# Patient Record
Sex: FEMALE | Race: WHITE | NOT HISPANIC OR LATINO | URBAN - METROPOLITAN AREA
[De-identification: names, ages, dates, MRNs, and addresses within clinical notes are randomized per-mention and may not be internally consistent; named-entity substitution may affect disease eponyms.]

---
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
## 2025-05-30 ENCOUNTER — EMERGENCY (EMERGENCY)
Facility: HOSPITAL | Age: 20
LOS: 1 days | End: 2025-05-30
Attending: EMERGENCY MEDICINE | Admitting: EMERGENCY MEDICINE
Payer: MEDICAID

## 2025-05-30 VITALS
WEIGHT: 136.91 LBS | RESPIRATION RATE: 20 BRPM | HEIGHT: 63 IN | TEMPERATURE: 97 F | SYSTOLIC BLOOD PRESSURE: 111 MMHG | HEART RATE: 112 BPM | DIASTOLIC BLOOD PRESSURE: 73 MMHG | OXYGEN SATURATION: 97 %

## 2025-05-30 PROCEDURE — 99284 EMERGENCY DEPT VISIT MOD MDM: CPT

## 2025-05-30 RX ORDER — KETOROLAC TROMETHAMINE 30 MG/ML
15 INJECTION, SOLUTION INTRAMUSCULAR; INTRAVENOUS ONCE
Refills: 0 | Status: DISCONTINUED | OUTPATIENT
Start: 2025-05-30 | End: 2025-05-30

## 2025-05-30 RX ORDER — LIDOCAINE HYDROCHLORIDE 20 MG/ML
15 JELLY TOPICAL ONCE
Refills: 0 | Status: DISCONTINUED | OUTPATIENT
Start: 2025-05-30 | End: 2025-05-30

## 2025-05-30 RX ORDER — IBUPROFEN 200 MG
1 TABLET ORAL
Qty: 9 | Refills: 0
Start: 2025-05-30 | End: 2025-06-01

## 2025-05-30 RX ORDER — DEXAMETHASONE 0.5 MG/1
10 TABLET ORAL ONCE
Refills: 0 | Status: COMPLETED | OUTPATIENT
Start: 2025-05-30 | End: 2025-05-30

## 2025-05-30 RX ADMIN — DEXAMETHASONE 10 MILLIGRAM(S): 0.5 TABLET ORAL at 05:44

## 2025-05-30 NOTE — ED ADULT TRIAGE NOTE - CHIEF COMPLAINT QUOTE
Pt is c/o sore throat and chills since last night.  c/o difficulty swallowing and talking due to swelling in her throat. Denies fever. She was diagnosed with Grave's disease few days ago after having palpitation.  Pt is on Methimazole and propanolol. Pt appears to be in no apparent distress.

## 2025-05-30 NOTE — ED PROVIDER NOTE - CLINICAL SUMMARY MEDICAL DECISION MAKING FREE TEXT BOX
20 female Graves disease now with sore throat, fever, chills, inability to take down solids/liquids.  (baseline) o/w HD stable. The patient presents with symptoms likely consistent with acute pharyngitis, likely viral in origin. Given her recent diagnosis of Graves' disease, she is concerned about potential thyroid involvement. Shall perform a flu panel. Prescribe pain medication for throat discomfort/inflammation. Shall continue monitoring. 20 female Graves disease now with sore throat, fever, chills, inability to take down solids/liquids.  sinus (baseline) o/w HD stable. The patient presents with symptoms likely consistent with acute pharyngitis, likely viral in origin. Given her recent diagnosis of Graves' disease, she is concerned about potential thyroid involvement. Patient has no signs concerning for thyrotoxicosis. Has no CNS symptoms, Denies SOB, Denies GI symptoms. Valverde-Wartofsky Point Scale (BWPS) is 10 suggesting unlikely thyrotoxicosis. Counselled patient needs NSAIDS. Is amenable for discharge.

## 2025-05-30 NOTE — ED PROVIDER NOTE - ATTENDING CONTRIBUTION TO CARE
I performed a face-to-face evaluation of the patient and performed a history and physical examination. I agree with the history and physical examination. If this was a PA visit, I personally saw the patient with the PA and performed a substantive portion of the visit including all aspects of the medical decision making.    Recent Dx hyperthyroid. On methimazole. 1 day sore throat. Centor score = 1 (no Abx or further testing). Steroids for pharyngitis: doi:[email or web address]. Not sexually-active.

## 2025-05-30 NOTE — ED PROVIDER NOTE - PHYSICAL EXAMINATION
PHYSICAL EXAM:  GENERAL: NAD, lying in bed comfortably  HEAD:  Atraumatic, Normocephalic  EYES: EOMI, PERRLA, conjunctiva and sclera clear  ENT: No erythema/pallor/petechiae/lesions  NECK: Supple  LUNG: CTA b/l; no r/r/w  HEART: Heart rate: Within normal range for the patient (patient remains in range of 100-120). RRR, +S1/S2; No m/r/g  ABDOMEN: soft, NT/ND; BS audible   EXTREMITIES:  2+ Peripheral Pulses, brisk cap refill. No clubbing, cyanosis, or edema  NERVOUS SYSTEM:  AAOx3, speech clear. No sensory/motor deficits   SKIN: No rashes or lesions

## 2025-05-30 NOTE — ED ADULT NURSE NOTE - OBJECTIVE STATEMENT
Pt received, alert and oriented x4, ambulatory with steady gait. C/O sore throat. Denies fever, chills, nausea, vomiting, diarrhea. Breathing is even and unlabored on room air. NAD. Pending dispo.

## 2025-05-30 NOTE — ED PROVIDER NOTE - OBJECTIVE STATEMENT
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   She reports difficulty swallowing and eating due to the pain while working her shift last night, which she describes as restricting her ability to tolerate solid/liquids. The patient denies fever or chills. She has recently been diagnosed with Graves' disease and started on medication (propranolol and methimazole) since Tuesday. The patient was hospitalized about a week and a half ago for tachycardia, during which she was diagnosed with Graves' disease. She reports no current palpitations but mentions experiencing night sweats recently. The patient also notes nausea yesterday while working, difficulty eating, and feeling more nauseous after eating.

## 2025-05-30 NOTE — ED PROVIDER NOTE - PATIENT PORTAL LINK FT
You can access the FollowMyHealth Patient Portal offered by [facility] by registering at the following website: [email or web address]. By joining [employer]’s FollowMyHealth portal, you will also be able to view your health information using other applications (apps) compatible with our system.

## 2025-05-30 NOTE — ED PROVIDER NOTE - NSFOLLOWUPINSTRUCTIONS_ED_ALL_ED_FT
WHAT YOU NEED TO KNOW:    What is pharyngitis? Pharyngitis, or sore throat, is inflammation of the tissues and structures in your pharynx (throat). Pharyngitis is most often caused by bacteria or a virus. Other causes include smoking, allergies, or acid reflux.    What are the signs and symptoms of pharyngitis? Signs and symptoms depend on the cause of your pharyngitis. You may have any of the following:    Sore throat or pain when you swallow    Fever, chills, and body aches    Hoarse or raspy voice    Cough, runny or stuffy nose, itchy or watery eyes    Headache    Upset stomach and loss of appetite    Whitish-yellow patches on the back of the throat    Tender, swollen lumps on the sides of the neck  How is pharyngitis diagnosed? Tell your healthcare provider about your symptoms and when they started. Your provider may look inside your throat and feel your neck. You may also need the following tests:    A throat culture may show which germ is causing your sore throat. A cotton swab is rubbed against the back of your throat.    Blood tests may be used to show if another medical condition is causing your sore throat.  How is pharyngitis treated? Viral pharyngitis will go away on its own without treatment. Your sore throat should start to feel better in 3 to 5 days. You may need medicines to decrease pain and swelling or to treat a bacterial infection.    How can I manage my symptoms?    Gargle salt water. Mix ¼ teaspoon salt in an 8 ounce glass of warm water and gargle. Do not swallow. Salt water may help decrease swelling in your throat.    Drink liquids as directed. You may need to drink more liquids than usual. Liquids may help soothe your throat and prevent dehydration. Ask how much liquid to drink each day and which liquids are best for you.    Use a cool mist humidifier. This will add moisture to the air and help decrease your cough.  Humidifier      Soothe your throat. Cough drops, ice, soft foods, or popsicles may help decrease throat pain.  How can I prevent the spread of pharyngitis? Cover your mouth and nose when you cough or sneeze. Do not share food or drinks. Wash your hands often. Use soap and water. If soap and water are unavailable, use an alcohol-based hand .  Handwashing    Call your local emergency number (911 in the [place]) if:    You have trouble breathing or swallowing because your throat is swollen.    When should I seek immediate care?    You are drooling because it hurts too much to swallow.    Your fever is higher than 102°F (39°C) or lasts longer than 3 days.    You are confused.    You taste blood.  When should I call my doctor?    Your throat pain gets worse.    You have a painful lump in your throat that does not go away after 5 days.    Your symptoms do not improve after 5 days.    You have questions or concerns about your condition or care.  CARE AGREEMENT:    You have the right to help plan your care. Learn about your health condition and how it may be treated. Discuss treatment options with your healthcare providers to decide what care you want to receive. You always have the right to refuse treatment.

## 2025-05-30 NOTE — ED ADULT TRIAGE NOTE - PAIN: PRESENCE, MLM
FAMILY HISTORY:  Sibling  Still living? Unknown  Diabetes mellitus, Age at diagnosis: Age Unknown    
complains of pain/discomfort

## 2025-09-07 ENCOUNTER — EMERGENCY (EMERGENCY)
Facility: HOSPITAL | Age: 20
LOS: 1 days | End: 2025-09-07
Attending: STUDENT IN AN ORGANIZED HEALTH CARE EDUCATION/TRAINING PROGRAM | Admitting: STUDENT IN AN ORGANIZED HEALTH CARE EDUCATION/TRAINING PROGRAM
Payer: COMMERCIAL

## 2025-09-07 VITALS
DIASTOLIC BLOOD PRESSURE: 77 MMHG | OXYGEN SATURATION: 100 % | TEMPERATURE: 98 F | SYSTOLIC BLOOD PRESSURE: 109 MMHG | HEART RATE: 61 BPM | RESPIRATION RATE: 15 BRPM

## 2025-09-07 VITALS
DIASTOLIC BLOOD PRESSURE: 81 MMHG | HEART RATE: 71 BPM | SYSTOLIC BLOOD PRESSURE: 123 MMHG | RESPIRATION RATE: 18 BRPM | OXYGEN SATURATION: 100 % | TEMPERATURE: 98 F | WEIGHT: 139.99 LBS

## 2025-09-07 PROBLEM — E05.00 THYROTOXICOSIS WITH DIFFUSE GOITER WITHOUT THYROTOXIC CRISIS OR STORM: Chronic | Status: ACTIVE | Noted: 2025-05-30

## 2025-09-07 LAB
ALBUMIN SERPL ELPH-MCNC: 4.3 G/DL — SIGNIFICANT CHANGE UP (ref 3.3–5)
ALP SERPL-CCNC: 92 U/L — SIGNIFICANT CHANGE UP (ref 40–120)
ALT FLD-CCNC: 18 U/L — SIGNIFICANT CHANGE UP (ref 4–33)
ANION GAP SERPL CALC-SCNC: 13 MMOL/L — SIGNIFICANT CHANGE UP (ref 7–14)
AST SERPL-CCNC: 15 U/L — SIGNIFICANT CHANGE UP (ref 4–32)
BASOPHILS # BLD AUTO: 0.04 K/UL — SIGNIFICANT CHANGE UP (ref 0–0.2)
BASOPHILS NFR BLD AUTO: 0.4 % — SIGNIFICANT CHANGE UP (ref 0–2)
BILIRUB SERPL-MCNC: <0.2 MG/DL — SIGNIFICANT CHANGE UP (ref 0.2–1.2)
BUN SERPL-MCNC: 14 MG/DL — SIGNIFICANT CHANGE UP (ref 7–23)
CALCIUM SERPL-MCNC: 9.2 MG/DL — SIGNIFICANT CHANGE UP (ref 8.4–10.5)
CHLORIDE SERPL-SCNC: 104 MMOL/L — SIGNIFICANT CHANGE UP (ref 98–107)
CO2 SERPL-SCNC: 22 MMOL/L — SIGNIFICANT CHANGE UP (ref 22–31)
CREAT SERPL-MCNC: 0.63 MG/DL — SIGNIFICANT CHANGE UP (ref 0.5–1.3)
EGFR: 130 ML/MIN/1.73M2 — SIGNIFICANT CHANGE UP
EGFR: 130 ML/MIN/1.73M2 — SIGNIFICANT CHANGE UP
EOSINOPHIL # BLD AUTO: 0.32 K/UL — SIGNIFICANT CHANGE UP (ref 0–0.5)
EOSINOPHIL NFR BLD AUTO: 3.5 % — SIGNIFICANT CHANGE UP (ref 0–6)
GLUCOSE SERPL-MCNC: 96 MG/DL — SIGNIFICANT CHANGE UP (ref 70–99)
HCG UR QL: NEGATIVE — SIGNIFICANT CHANGE UP
HCT VFR BLD CALC: 39.5 % — SIGNIFICANT CHANGE UP (ref 34.5–45)
HGB BLD-MCNC: 12.6 G/DL — SIGNIFICANT CHANGE UP (ref 11.5–15.5)
IMM GRANULOCYTES # BLD AUTO: 0.04 K/UL — SIGNIFICANT CHANGE UP (ref 0–0.07)
IMM GRANULOCYTES NFR BLD AUTO: 0.4 % — SIGNIFICANT CHANGE UP (ref 0–0.9)
LIDOCAIN IGE QN: 50 U/L — SIGNIFICANT CHANGE UP (ref 7–60)
LYMPHOCYTES # BLD AUTO: 2.34 K/UL — SIGNIFICANT CHANGE UP (ref 1–3.3)
LYMPHOCYTES NFR BLD AUTO: 25.6 % — SIGNIFICANT CHANGE UP (ref 13–44)
MAGNESIUM SERPL-MCNC: 1.9 MG/DL — SIGNIFICANT CHANGE UP (ref 1.6–2.6)
MCHC RBC-ENTMCNC: 24.1 PG — LOW (ref 27–34)
MCHC RBC-ENTMCNC: 31.9 G/DL — LOW (ref 32–36)
MCV RBC AUTO: 75.5 FL — LOW (ref 80–100)
MONOCYTES # BLD AUTO: 0.78 K/UL — SIGNIFICANT CHANGE UP (ref 0–0.9)
MONOCYTES NFR BLD AUTO: 8.5 % — SIGNIFICANT CHANGE UP (ref 2–14)
NEUTROPHILS # BLD AUTO: 5.61 K/UL — SIGNIFICANT CHANGE UP (ref 1.8–7.4)
NEUTROPHILS NFR BLD AUTO: 61.6 % — SIGNIFICANT CHANGE UP (ref 43–77)
NRBC # BLD AUTO: 0 K/UL — SIGNIFICANT CHANGE UP (ref 0–0)
NRBC # FLD: 0 K/UL — SIGNIFICANT CHANGE UP (ref 0–0)
NRBC BLD AUTO-RTO: 0 /100 WBCS — SIGNIFICANT CHANGE UP (ref 0–0)
PHOSPHATE SERPL-MCNC: 4.6 MG/DL — HIGH (ref 2.5–4.5)
PLATELET # BLD AUTO: 356 K/UL — SIGNIFICANT CHANGE UP (ref 150–400)
PMV BLD: 9.8 FL — SIGNIFICANT CHANGE UP (ref 7–13)
POTASSIUM SERPL-MCNC: 3.9 MMOL/L — SIGNIFICANT CHANGE UP (ref 3.5–5.3)
POTASSIUM SERPL-SCNC: 3.9 MMOL/L — SIGNIFICANT CHANGE UP (ref 3.5–5.3)
PROT SERPL-MCNC: 7.7 G/DL — SIGNIFICANT CHANGE UP (ref 6–8.3)
RBC # BLD: 5.23 M/UL — HIGH (ref 3.8–5.2)
RBC # FLD: 15 % — HIGH (ref 10.3–14.5)
SODIUM SERPL-SCNC: 139 MMOL/L — SIGNIFICANT CHANGE UP (ref 135–145)
TSH SERPL-MCNC: 14.54 UIU/ML — HIGH (ref 0.27–4.2)
WBC # BLD: 9.13 K/UL — SIGNIFICANT CHANGE UP (ref 3.8–10.5)
WBC # FLD AUTO: 9.13 K/UL — SIGNIFICANT CHANGE UP (ref 3.8–10.5)

## 2025-09-07 PROCEDURE — 93010 ELECTROCARDIOGRAM REPORT: CPT

## 2025-09-07 PROCEDURE — 99284 EMERGENCY DEPT VISIT MOD MDM: CPT

## 2025-09-07 RX ORDER — MAGNESIUM, ALUMINUM HYDROXIDE 200-200 MG
30 TABLET,CHEWABLE ORAL ONCE
Refills: 0 | Status: COMPLETED | OUTPATIENT
Start: 2025-09-07 | End: 2025-09-07

## 2025-09-07 RX ORDER — ONDANSETRON HCL/PF 4 MG/2 ML
4 VIAL (ML) INJECTION ONCE
Refills: 0 | Status: COMPLETED | OUTPATIENT
Start: 2025-09-07 | End: 2025-09-07

## 2025-09-07 RX ADMIN — Medication 30 MILLILITER(S): at 06:50

## 2025-09-07 RX ADMIN — Medication 1000 MILLILITER(S): at 06:50

## 2025-09-07 RX ADMIN — Medication 4 MILLIGRAM(S): at 06:50

## 2025-09-07 RX ADMIN — Medication 20 MILLIGRAM(S): at 06:50
